# Patient Record
Sex: FEMALE | Race: WHITE | Employment: FULL TIME | URBAN - METROPOLITAN AREA
[De-identification: names, ages, dates, MRNs, and addresses within clinical notes are randomized per-mention and may not be internally consistent; named-entity substitution may affect disease eponyms.]

---
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
## 2024-02-06 ENCOUNTER — HOSPITAL ENCOUNTER (OUTPATIENT)
Dept: NON INVASIVE DIAGNOSTICS | Age: 54
Discharge: HOME | End: 2024-02-06
Payer: COMMERCIAL

## 2024-02-06 ENCOUNTER — OFFICE VISIT (OUTPATIENT)
Dept: CARDIOLOGY CLINIC | Age: 54
End: 2024-02-06
Payer: COMMERCIAL

## 2024-02-06 VITALS
HEART RATE: 67 BPM | RESPIRATION RATE: 22 BRPM | OXYGEN SATURATION: 97 % | SYSTOLIC BLOOD PRESSURE: 120 MMHG | BODY MASS INDEX: 39.13 KG/M2 | WEIGHT: 229.2 LBS | DIASTOLIC BLOOD PRESSURE: 80 MMHG | HEIGHT: 64 IN

## 2024-02-06 DIAGNOSIS — R06.00 DYSPNEA, UNSPECIFIED TYPE: ICD-10-CM

## 2024-02-06 DIAGNOSIS — I44.7 LEFT BUNDLE BRANCH BLOCK: Primary | ICD-10-CM

## 2024-02-06 DIAGNOSIS — R07.89 COSTOCHONDRAL CHEST PAIN: ICD-10-CM

## 2024-02-06 DIAGNOSIS — I44.7 LEFT BUNDLE BRANCH BLOCK: ICD-10-CM

## 2024-02-06 PROCEDURE — 99204 OFFICE O/P NEW MOD 45 MIN: CPT | Performed by: INTERNAL MEDICINE

## 2024-02-06 PROCEDURE — 93306 TTE W/DOPPLER COMPLETE: CPT

## 2024-02-06 NOTE — PATIENT INSTRUCTIONS
Echocardiogram to assess left bundle branch block on EKG    Schedule an appointment with PCP    May be beneficial to see a Pulmonologist for lung testing    Follow up in 2 months    Please call with any concerns or worsening symptoms

## 2024-03-01 ENCOUNTER — OFFICE VISIT (OUTPATIENT)
Dept: URGENT CARE | Age: 54
End: 2024-03-01

## 2024-03-01 VITALS
BODY MASS INDEX: 39.71 KG/M2 | HEART RATE: 79 BPM | SYSTOLIC BLOOD PRESSURE: 117 MMHG | TEMPERATURE: 97.9 F | DIASTOLIC BLOOD PRESSURE: 79 MMHG | OXYGEN SATURATION: 98 % | WEIGHT: 232.6 LBS | HEIGHT: 64 IN

## 2024-03-01 DIAGNOSIS — E06.3 HASHIMOTO'S DISEASE: ICD-10-CM

## 2024-03-01 DIAGNOSIS — R53.83 OTHER FATIGUE: Primary | ICD-10-CM

## 2024-03-01 ASSESSMENT — ENCOUNTER SYMPTOMS
COUGH: 0
DIARRHEA: 0
SHORTNESS OF BREATH: 0
NAUSEA: 0
EYE REDNESS: 0
EYE PAIN: 0
ABDOMINAL PAIN: 0
EYE DISCHARGE: 0
VOMITING: 0
SORE THROAT: 0
SINUS PRESSURE: 0

## 2024-03-01 NOTE — PATIENT INSTRUCTIONS
- Pt to drink lots of fluids  - Pt ok to take tylenol and ibuprofen as needed  - Pt to call if any symptoms worsen or follow up with PCP  - Pt to go to ER if have shortness of breath or chest pain

## 2024-03-01 NOTE — PROGRESS NOTES
Rochelle Strange (: 1970) is a 53 y.o. female, New patient, here for evaluation of the following chief complaint(s):  Other (A week ago patient went to the ER for shortness of breath + back issues, concerned about pleurisy. Patient complains of severe fatigue today + hoarse voice. Needs work note. States her hashimoto's is flaring.  )      ASSESSMENT/PLAN:    ICD-10-CM    1. Other fatigue  R53.83       2. Hashimoto's disease  E06.3           - Pt did not want any testing done. Low concern for strep pharyngitis, otitis media, bacterial pneumonia, bronchitis, sinusitis or sepsis.   - Pt to drink lots of fluids  - Pt ok to take tylenol and ibuprofen as needed  - Pt to call if any symptoms worsen or follow up with PCP  - Pt to go to ER if have shortness of breath or chest pain    Follow up with your PCP within 5 days or, if unable, you can return to the clinic if symptoms persist beyond 5 days or if symptoms worsen.  The patient tolerated their visit well. The patient and/or the family were informed of the results of any tests, a time was given to answer questions, a plan was proposed and they agreed with plan. Reviewed AVS with treatment instructions and answered questions - pt/family expresses understanding and agreement with the discussed treatment plan and AVS instructions.    SUBJECTIVE/OBJECTIVE:  HPI:   53 y.o. female presents for complaint of pt states she has a hx of hashimoto's and she started yesterday with a flare up that mainly consists of fatigue so she states she was not able to make it into work and needed a note.     Denies symptoms of nasal congestion, cough, fever, body aches, chills, sore throat, ear pain, abdominal pain, vomiting or diarrhea.    Has taken ibuprofen at home. No known sick contacts.       History provided by:  Patient   used: No        VITAL SIGNS  Vitals:    24 1310   BP: 117/79   Site: Left Upper Arm   Position: Sitting   Cuff Size: Large Adult

## 2024-06-04 ENCOUNTER — OFFICE VISIT (OUTPATIENT)
Dept: URGENT CARE | Age: 54
End: 2024-06-04

## 2024-06-04 VITALS
TEMPERATURE: 97.7 F | WEIGHT: 220 LBS | HEART RATE: 84 BPM | OXYGEN SATURATION: 96 % | BODY MASS INDEX: 37.56 KG/M2 | DIASTOLIC BLOOD PRESSURE: 84 MMHG | HEIGHT: 64 IN | SYSTOLIC BLOOD PRESSURE: 127 MMHG

## 2024-06-04 DIAGNOSIS — R03.0 ELEVATED BLOOD PRESSURE READING WITHOUT DIAGNOSIS OF HYPERTENSION: ICD-10-CM

## 2024-06-04 DIAGNOSIS — M79.10 MYALGIA: Primary | ICD-10-CM

## 2024-06-04 DIAGNOSIS — R53.83 OTHER FATIGUE: ICD-10-CM

## 2024-06-04 DIAGNOSIS — E06.3 HASHIMOTO'S DISEASE: ICD-10-CM

## 2024-06-04 NOTE — PROGRESS NOTES
Rochelle Strange (: 1970) is a 53 y.o. female, Established patient, here for evaluation of the following chief complaint(s):  Generalized Body Aches (Has autoimmune disorder Hashimoto's. Has much joint pain and eye lids swollen x2 days.   Just needs vitals checked and a note for work./)      ASSESSMENT/PLAN:    ICD-10-CM    1. Myalgia  M79.10       2. Other fatigue  R53.83       3. Hashimoto's disease  E06.3 External Referral To Primary Care      4. Elevated blood pressure reading without diagnosis of hypertension  R03.0 External Referral To Primary Care          - Hashimoto's Flare:  Given pt's history, the pt's current symptoms and exam concerning for her Hashimoto's flare.  Provided pt with a work note and further discussed recommendations to continue to follow up with her PCP and Endocrinologist for continued treatment and workup for her condition.    Discussed PCP follow up for persisting or worsening symptoms, or to return to the clinic if unable to obtain PCP follow up for worsening symptoms.    - High blood pressure without diagnosis of Hypertension:  Pt's BP was noted to be elevated during initial vital signs assessment - repeated BP assessment, >5 minutes after the initial measurement, shows persistent BP elevation.   Low concerns for hypertensive crisis or end organ damage at this time given pt's current symptoms and exam findings.  Discussed dietary changes and increase in exercise to provide initial non-pharmacological management.  Discussed continued at-home monitoring of BP.  PCP follow up discussed with referral back to PCP provided for further evaluation regarding the pt's elevated BP readings noted in clinic.  Strict ED follow up instructions provided for any worsening HTN symptoms.      The patient tolerated their visit well. The patient and/or the family were informed of the results of any tests, a time was given to answer questions, a plan was proposed and they agreed with plan.

## 2024-06-04 NOTE — PATIENT INSTRUCTIONS
Follow up with your PCP for further evaluation regarding your flare.  Continue with the scheduled biopsy with your Endocrinologist for further evaluation and treatment of your underlying condition.    Continue at-home monitoring of BP - recommend keeping a log of your blood pressures to discuss with your PCP.   Follow up with PCP to further evaluate your elevated blood pressures noted in clinic - referral provided.  If headaches, vision changes, chest pain, shortness of breath, back pain, abdominal pain, weakness, numbness, dizziness, lightheadedness, or any other concerns develop, follow up with the ER for further evaluation.

## 2024-10-22 ENCOUNTER — OFFICE VISIT (OUTPATIENT)
Dept: URGENT CARE | Age: 54
End: 2024-10-22

## 2024-10-22 VITALS
HEIGHT: 64 IN | OXYGEN SATURATION: 97 % | SYSTOLIC BLOOD PRESSURE: 121 MMHG | WEIGHT: 223.8 LBS | DIASTOLIC BLOOD PRESSURE: 75 MMHG | HEART RATE: 100 BPM | BODY MASS INDEX: 38.21 KG/M2 | TEMPERATURE: 98.1 F

## 2024-10-22 DIAGNOSIS — M79.10 MYALGIA: Primary | ICD-10-CM

## 2024-10-22 DIAGNOSIS — I83.813 VARICOSE VEINS OF BOTH LOWER EXTREMITIES WITH PAIN: ICD-10-CM

## 2024-10-22 DIAGNOSIS — E06.3 HASHIMOTO'S DISEASE: ICD-10-CM

## 2024-10-22 PROBLEM — I44.7 LBBB (LEFT BUNDLE BRANCH BLOCK): Status: ACTIVE | Noted: 2024-04-09

## 2024-10-22 PROBLEM — E66.01 SEVERE OBESITY (BMI 35.0-39.9) WITH COMORBIDITY: Status: ACTIVE | Noted: 2024-04-09

## 2024-10-22 PROBLEM — I82.812 ACUTE SUPERFICIAL VENOUS THROMBOSIS OF LOWER EXTREMITY, LEFT: Status: ACTIVE | Noted: 2024-04-09

## 2024-10-22 PROBLEM — J30.9 ALLERGIC RHINITIS: Status: ACTIVE | Noted: 2024-10-22

## 2024-10-22 ASSESSMENT — VISUAL ACUITY: OU: 1

## 2024-10-22 NOTE — PATIENT INSTRUCTIONS
Continue following up with your PCP.  Discuss your concerns with your varicose veins to your PCP who can follow up with testing should they deem it appropriate.

## 2024-10-22 NOTE — PROGRESS NOTES
Rochelle Strange (: 1970) is a 53 y.o. female, Established patient, here for evaluation of the following chief complaint(s):  Other (Hashimoto's flare up, seeing PCP next Wednesday, but has not gone to work the last 2 days and needs a work note to be able to return tomorrow.  )      ASSESSMENT/PLAN:    ICD-10-CM    1. Myalgia  M79.10       2. Hashimoto's disease  E06.3       3. Varicose veins of both lower extremities with pain  I83.813           - Hashimoto's Flare:  Exam concerning for flare of the pt's chronic Hashimoto's disease.  Work note provided per pt's request.    - Varicose Veins of both legs with right leg pain:  Discussed pain of the varicose veins of the right leg, no evidence of infection or clots, cannot rule out contusion or other mild injury of they site, however of lower concern at this time given lack of abrasions and bruising. Discussed PCP follow up should symptoms persist or worsen.    Discussed PCP follow up with the currently established appointment, or to return to the clinic if unable to obtain PCP follow up for worsening symptoms.    The patient tolerated their visit well. The patient and/or the family were informed of the results of any tests, a time was given to answer questions, a plan was proposed and they agreed with plan. Reviewed AVS with treatment instructions and answered questions - pt/family expresses understanding and agreement with the discussed treatment plan and AVS instructions.      SUBJECTIVE/OBJECTIVE:  HPI:   53 y.o. female presents for complaint of flare of her hashimoto's encephalopathy x 2 days.    Notes having increased joint inflammation and pain, eye/facial swelling, fatigue, and increased inflammation of her vocal nodules causing voice hoarseness.     Denies fevers, headaches, n/v/d, congestion, cough, wheezing, SOB, and chest pain.    Has attempted nothing for symptoms at this time.    Notes having an appointment coming up early next week with her